# Patient Record
Sex: MALE | Race: WHITE | Employment: FULL TIME | URBAN - NONMETROPOLITAN AREA
[De-identification: names, ages, dates, MRNs, and addresses within clinical notes are randomized per-mention and may not be internally consistent; named-entity substitution may affect disease eponyms.]

---

## 2022-09-26 ENCOUNTER — HOSPITAL ENCOUNTER (OUTPATIENT)
Dept: ULTRASOUND IMAGING | Age: 52
Discharge: HOME | End: 2022-09-26
Payer: OTHER GOVERNMENT

## 2022-09-26 DIAGNOSIS — R94.5 ABNORMAL RESULTS OF LIVER FUNCTION STUDIES: ICD-10-CM

## 2022-09-26 PROCEDURE — 76705 ECHO EXAM OF ABDOMEN: CPT

## 2022-10-20 ENCOUNTER — HOSPITAL ENCOUNTER (OUTPATIENT)
Age: 52
Discharge: HOME | End: 2022-10-20
Payer: OTHER GOVERNMENT

## 2022-10-20 DIAGNOSIS — D49.7 PITUITARY TUMOR: ICD-10-CM

## 2022-10-20 DIAGNOSIS — R79.89 LOW TESTOSTERONE IN MALE: ICD-10-CM

## 2022-10-20 LAB
ALBUMIN SERPL-MCNC: 4.7 G/DL (ref 3.5–5.1)
ALP BLD-CCNC: 71 U/L (ref 38–126)
ALT SERPL-CCNC: 48 U/L (ref 11–66)
ANION GAP SERPL CALCULATED.3IONS-SCNC: 13 MEQ/L (ref 8–16)
AST SERPL-CCNC: 32 U/L (ref 5–40)
BILIRUB SERPL-MCNC: 1.7 MG/DL (ref 0.3–1.2)
BUN BLDV-MCNC: 17 MG/DL (ref 7–22)
CALCIUM SERPL-MCNC: 10 MG/DL (ref 8.5–10.5)
CHLORIDE BLD-SCNC: 106 MEQ/L (ref 98–111)
CO2: 23 MEQ/L (ref 23–33)
CORTISOL COLLECTION INFO: NORMAL
CORTISOL: 6.79 UG/DL
CREAT SERPL-MCNC: 1.2 MG/DL (ref 0.4–1.2)
FOLLICLE STIMULATING HORMONE: 18.6 MIU/ML (ref 1.5–12.4)
GFR SERPL CREATININE-BSD FRML MDRD: > 60 ML/MIN/1.73M2
GLUCOSE BLD-MCNC: 110 MG/DL (ref 70–108)
LUTEINIZING HORMONE: 22.3 MIU/ML (ref 1.7–8.6)
POTASSIUM SERPL-SCNC: 4.1 MEQ/L (ref 3.5–5.2)
PROLACTIN: 8.6 NG/ML
SODIUM BLD-SCNC: 142 MEQ/L (ref 135–145)
TOTAL PROTEIN: 7.3 G/DL (ref 6.1–8)
TSH SERPL DL<=0.05 MIU/L-ACNC: 1.09 UIU/ML (ref 0.4–4.2)

## 2022-10-20 PROCEDURE — 36415 COLL VENOUS BLD VENIPUNCTURE: CPT

## 2022-10-20 PROCEDURE — 83002 ASSAY OF GONADOTROPIN (LH): CPT

## 2022-10-20 PROCEDURE — 84270 ASSAY OF SEX HORMONE GLOBUL: CPT

## 2022-10-20 PROCEDURE — 80061 LIPID PANEL: CPT

## 2022-10-20 PROCEDURE — 84403 ASSAY OF TOTAL TESTOSTERONE: CPT

## 2022-10-20 PROCEDURE — 84146 ASSAY OF PROLACTIN: CPT

## 2022-10-20 PROCEDURE — 84439 ASSAY OF FREE THYROXINE: CPT

## 2022-10-20 PROCEDURE — 84443 ASSAY THYROID STIM HORMONE: CPT

## 2022-10-20 PROCEDURE — 84402 ASSAY OF FREE TESTOSTERONE: CPT

## 2022-10-20 PROCEDURE — 82533 TOTAL CORTISOL: CPT

## 2022-10-20 PROCEDURE — 80053 COMPREHEN METABOLIC PANEL: CPT

## 2022-10-20 PROCEDURE — 83001 ASSAY OF GONADOTROPIN (FSH): CPT

## 2022-10-20 PROCEDURE — 82397 CHEMILUMINESCENT ASSAY: CPT

## 2022-10-21 LAB
CHOLESTEROL, TOTAL: 171 MG/DL (ref 100–199)
HDLC SERPL-MCNC: 35 MG/DL
LDL CHOLESTEROL CALCULATED: 89 MG/DL
T4 FREE: 1.23 NG/DL (ref 0.93–1.76)
TRIGL SERPL-MCNC: 235 MG/DL (ref 0–199)

## 2022-10-22 LAB
IGF BINDING PROTEIN-3: 4010 NG/ML (ref 2528–5050)
TESTOSTERONE FREE: NORMAL

## 2023-06-29 ENCOUNTER — HOSPITAL ENCOUNTER (OUTPATIENT)
Dept: MRI IMAGING | Age: 53
Discharge: HOME | End: 2023-06-29
Payer: OTHER GOVERNMENT

## 2023-06-29 DIAGNOSIS — D35.2 PITUITARY MACROADENOMA (HCC): ICD-10-CM

## 2023-06-29 PROCEDURE — A9579 GAD-BASE MR CONTRAST NOS,1ML: HCPCS | Performed by: STUDENT IN AN ORGANIZED HEALTH CARE EDUCATION/TRAINING PROGRAM

## 2023-06-29 PROCEDURE — 70553 MRI BRAIN STEM W/O & W/DYE: CPT

## 2023-06-29 PROCEDURE — 6360000004 HC RX CONTRAST MEDICATION: Performed by: STUDENT IN AN ORGANIZED HEALTH CARE EDUCATION/TRAINING PROGRAM

## 2023-06-29 RX ADMIN — GADOTERIDOL 20 ML: 279.3 INJECTION, SOLUTION INTRAVENOUS at 15:47

## 2023-08-02 ENCOUNTER — OFFICE VISIT (OUTPATIENT)
Dept: CARDIOLOGY CLINIC | Age: 53
End: 2023-08-02
Payer: OTHER GOVERNMENT

## 2023-08-02 ENCOUNTER — TELEPHONE (OUTPATIENT)
Dept: CARDIOLOGY CLINIC | Age: 53
End: 2023-08-02

## 2023-08-02 VITALS
DIASTOLIC BLOOD PRESSURE: 93 MMHG | SYSTOLIC BLOOD PRESSURE: 143 MMHG | WEIGHT: 305 LBS | BODY MASS INDEX: 46.23 KG/M2 | HEIGHT: 68 IN | HEART RATE: 89 BPM

## 2023-08-02 DIAGNOSIS — I50.32 CHRONIC HEART FAILURE WITH PRESERVED EJECTION FRACTION (HFPEF) (HCC): ICD-10-CM

## 2023-08-02 DIAGNOSIS — R06.09 DOE (DYSPNEA ON EXERTION): ICD-10-CM

## 2023-08-02 DIAGNOSIS — R42 DIZZINESS: Primary | ICD-10-CM

## 2023-08-02 PROCEDURE — 93000 ELECTROCARDIOGRAM COMPLETE: CPT | Performed by: INTERNAL MEDICINE

## 2023-08-02 PROCEDURE — 99204 OFFICE O/P NEW MOD 45 MIN: CPT | Performed by: INTERNAL MEDICINE

## 2023-08-02 RX ORDER — FUROSEMIDE 20 MG/1
20 TABLET ORAL DAILY
Qty: 90 TABLET | Refills: 1 | Status: SHIPPED | OUTPATIENT
Start: 2023-08-02 | End: 2023-08-02 | Stop reason: SDUPTHER

## 2023-08-02 RX ORDER — FUROSEMIDE 20 MG/1
20 TABLET ORAL DAILY
Qty: 90 TABLET | Refills: 1 | Status: SHIPPED | OUTPATIENT
Start: 2023-08-02

## 2023-08-02 NOTE — PROGRESS NOTES
[address]  Dept: [phone]  Dept Fax: [phone]  Loc: [phone]    Visit Date: 8/2/2023    Mr. Carlyon Lombard is a 46 y.o. male  who presented for:  Chief Complaint   Patient presents with    New Patient   Establish care  POTS  Leg swelling  CP, ROBERTS  HPI:   HPI   Mabel Braden is a pleasant 46year old male patient who has PMH of PE, POTS, HTN, Dyslipidemia. He report h/o POTS that was diagnosed at [facility] many years ago. He has been treated with atenolol, symptoms have improved since then. Denies dizziness. He has [address] 4 years ago at OSH, \"ok\" per patient, no h/o stenting. Denies smoking history. He denies h/o MI, CHF. He was previously treated with a diuretic for leg swelling. Swelling imprvoed, but diuretics were stopped due to h/o gout. Now with recurrent swelling in both legs. He reports shortness of breath, dyspnea on exertion and Fatigue. He has occasional right sided chest pain. Better with GERD medicine. He takes warfarin for unprovoked PE. INR is watched by his [facility] coumdion clinic.      Current Outpatient Medications:     letrozole (FEMARA) 2.5 MG tablet, Take 1 tablet by mouth daily, Disp: 30 tablet, Rfl: 2    warfarin (COUMADIN) 5 MG tablet, Take 5 mg by mouth, Disp: , Rfl:     omeprazole (PRILOSEC) 20 MG delayed release capsule, Take 20 mg by mouth daily, Disp: , Rfl:     DULoxetine (CYMBALTA) 60 MG extended release capsule, Take 60 mg by mouth daily, Disp: , Rfl:     senna (SENOKOT) 8.6 MG tablet, Take 1 tablet by mouth daily, Disp: , Rfl:     atenolol (TENORMIN) 100 MG tablet, Take 100 mg by mouth daily, Disp: , Rfl:     cetirizine (ZYRTEC) 10 MG tablet, Take 10 mg by mouth daily, Disp: , Rfl:     allopurinol (ZYLOPRIM) 100 MG tablet, Take 100 mg by mouth daily, Disp: , Rfl:     pravastatin (PRAVACHOL) 40 MG tablet, Take 40 mg by mouth daily, Disp: , Rfl:     Past Medical

## 2023-08-22 ENCOUNTER — HOSPITAL ENCOUNTER (OUTPATIENT)
Dept: NON INVASIVE DIAGNOSTICS | Age: 53
Discharge: HOME | End: 2023-08-22
Attending: INTERNAL MEDICINE
Payer: OTHER GOVERNMENT

## 2023-08-22 DIAGNOSIS — I50.32 CHRONIC HEART FAILURE WITH PRESERVED EJECTION FRACTION (HFPEF) (HCC): ICD-10-CM

## 2023-08-22 DIAGNOSIS — R42 DIZZINESS: ICD-10-CM

## 2023-08-22 DIAGNOSIS — R06.09 DOE (DYSPNEA ON EXERTION): ICD-10-CM

## 2023-08-22 LAB
LV EF: 58 %
LVEF MODALITY: NORMAL

## 2023-08-22 PROCEDURE — 6360000002 HC RX W HCPCS

## 2023-08-22 PROCEDURE — 3430000000 HC RX DIAGNOSTIC RADIOPHARMACEUTICAL: Performed by: INTERNAL MEDICINE

## 2023-08-22 PROCEDURE — 93306 TTE W/DOPPLER COMPLETE: CPT

## 2023-08-22 PROCEDURE — 93017 CV STRESS TEST TRACING ONLY: CPT | Performed by: INTERNAL MEDICINE

## 2023-08-22 PROCEDURE — A9500 TC99M SESTAMIBI: HCPCS | Performed by: INTERNAL MEDICINE

## 2023-08-22 PROCEDURE — 78452 HT MUSCLE IMAGE SPECT MULT: CPT | Performed by: INTERNAL MEDICINE

## 2023-08-22 RX ORDER — TETRAKIS(2-METHOXYISOBUTYLISOCYANIDE)COPPER(I) TETRAFLUOROBORATE 1 MG/ML
33.8 INJECTION, POWDER, LYOPHILIZED, FOR SOLUTION INTRAVENOUS
Status: COMPLETED | OUTPATIENT
Start: 2023-08-22 | End: 2023-08-22

## 2023-08-22 RX ORDER — TETRAKIS(2-METHOXYISOBUTYLISOCYANIDE)COPPER(I) TETRAFLUOROBORATE 1 MG/ML
9.5 INJECTION, POWDER, LYOPHILIZED, FOR SOLUTION INTRAVENOUS
Status: COMPLETED | OUTPATIENT
Start: 2023-08-22 | End: 2023-08-22

## 2023-08-22 RX ADMIN — Medication 33.8 MILLICURIE: at 09:00

## 2023-08-22 RX ADMIN — Medication 9.5 MILLICURIE: at 08:10

## 2024-01-31 ENCOUNTER — HOSPITAL ENCOUNTER (OUTPATIENT)
Age: 54
Discharge: HOME | End: 2024-01-31
Payer: OTHER GOVERNMENT

## 2024-01-31 ENCOUNTER — OFFICE VISIT (OUTPATIENT)
Age: 54
End: 2024-01-31
Payer: OTHER GOVERNMENT

## 2024-01-31 VITALS
HEART RATE: 67 BPM | HEIGHT: 69 IN | SYSTOLIC BLOOD PRESSURE: 130 MMHG | WEIGHT: 314 LBS | DIASTOLIC BLOOD PRESSURE: 84 MMHG | BODY MASS INDEX: 46.51 KG/M2 | RESPIRATION RATE: 18 BRPM

## 2024-01-31 DIAGNOSIS — R79.89 LOW TESTOSTERONE IN MALE: ICD-10-CM

## 2024-01-31 DIAGNOSIS — R73.03 PRE-DIABETES: ICD-10-CM

## 2024-01-31 DIAGNOSIS — D49.7 PITUITARY TUMOR: Primary | ICD-10-CM

## 2024-01-31 DIAGNOSIS — E78.5 HYPERLIPIDEMIA, UNSPECIFIED HYPERLIPIDEMIA TYPE: ICD-10-CM

## 2024-01-31 DIAGNOSIS — I26.99 PULMONARY EMBOLISM, OTHER, UNSPECIFIED CHRONICITY, UNSPECIFIED WHETHER ACUTE COR PULMONALE PRESENT (HCC): ICD-10-CM

## 2024-01-31 LAB
CHOLEST SERPL-MCNC: 202 MG/DL (ref 100–199)
DEPRECATED MEAN GLUCOSE BLD GHB EST-ACNC: 120 MG/DL (ref 70–126)
HBA1C MFR BLD HPLC: 6 % (ref 4.4–6.4)
HDLC SERPL-MCNC: 34 MG/DL
INR PPP: 1.71 (ref 0.85–1.13)
LDLC SERPL CALC-MCNC: ABNORMAL MG/DL
PROLACTIN SERPL-MCNC: 7.8 NG/ML
T4 FREE SERPL-MCNC: 1.36 NG/DL (ref 0.93–1.76)
TRIGL SERPL-MCNC: 403 MG/DL (ref 0–199)
TSH SERPL DL<=0.005 MIU/L-ACNC: 0.69 UIU/ML (ref 0.4–4.2)

## 2024-01-31 PROCEDURE — 84305 ASSAY OF SOMATOMEDIN: CPT

## 2024-01-31 PROCEDURE — 84443 ASSAY THYROID STIM HORMONE: CPT

## 2024-01-31 PROCEDURE — 85610 PROTHROMBIN TIME: CPT

## 2024-01-31 PROCEDURE — 83036 HEMOGLOBIN GLYCOSYLATED A1C: CPT

## 2024-01-31 PROCEDURE — 83002 ASSAY OF GONADOTROPIN (LH): CPT

## 2024-01-31 PROCEDURE — 84439 ASSAY OF FREE THYROXINE: CPT

## 2024-01-31 PROCEDURE — 83001 ASSAY OF GONADOTROPIN (FSH): CPT

## 2024-01-31 PROCEDURE — 36415 COLL VENOUS BLD VENIPUNCTURE: CPT

## 2024-01-31 PROCEDURE — 84403 ASSAY OF TOTAL TESTOSTERONE: CPT

## 2024-01-31 PROCEDURE — 84146 ASSAY OF PROLACTIN: CPT

## 2024-01-31 PROCEDURE — 80061 LIPID PANEL: CPT

## 2024-01-31 PROCEDURE — 99214 OFFICE O/P EST MOD 30 MIN: CPT | Performed by: INTERNAL MEDICINE

## 2024-01-31 RX ORDER — ACETAMINOPHEN 500 MG
500 TABLET ORAL EVERY 6 HOURS PRN
COMMUNITY

## 2024-01-31 RX ORDER — PRAZOSIN HYDROCHLORIDE 5 MG/1
5 CAPSULE ORAL NIGHTLY
COMMUNITY

## 2024-01-31 RX ORDER — ALBUTEROL SULFATE 90 UG/1
2 AEROSOL, METERED RESPIRATORY (INHALATION) EVERY 6 HOURS PRN
COMMUNITY

## 2024-01-31 NOTE — PROGRESS NOTES
is normal.        Assessment/Plan:   Diagnosis Orders   1. Pituitary tumor  Previously with no evidence of hormonal disturbance.  No major symptoms at this point.  Patient was reminded of the risk of pituitary apoplexy.  We will obtain recent imaging studies for my review.  I will check prolactin and IGF-I.      2. Low testosterone in male  Previously treated with letrozole off label.  He has not noticed any clinical changes since he discontinued this medication.  I will check his testosterone and LH levels.                                      3. Pre-diabetes  Patient has gained some weight since the last visit.  Will check his A1c.      4. Hyperlipidemia, unspecified hyperlipidemia type  Treated with  Pravachol which is well-tolerated.  We will check his lipids      5. Pulmonary embolism, other, unspecified chronicity, unspecified whether acute cor pulmonale present (HCC)  On Coumadin.  Monitored by Levi JETT.  Patient requested INR check.  He will have report forwarded to the date and the          Orders Placed This Encounter   Procedures    Follicle Stimulating Hormone     Standing Status:   Future     Standing Expiration Date:   4/30/2024    Luteinizing Hormone     Standing Status:   Future     Standing Expiration Date:   4/30/2024    Testosterone male     Standing Status:   Future     Standing Expiration Date:   4/30/2024    Prolactin     Standing Status:   Future     Standing Expiration Date:   4/30/2024    Insulin-Like Growth Factor     Standing Status:   Future     Standing Expiration Date:   4/30/2024    TSH     Standing Status:   Future     Standing Expiration Date:   4/30/2024    T4, Free     Standing Status:   Future     Standing Expiration Date:   4/30/2024    Hemoglobin A1C     Standing Status:   Future     Standing Expiration Date:   4/30/2024    Lipid Panel     Standing Status:   Future     Standing Expiration Date:   4/30/2024    Protime-INR     Standing Status:   Future     Standing Expiration

## 2024-02-01 LAB
FOLLICLE STIMULATING HORMONE: 4.2 MIU/ML (ref 1.5–12.4)
LUTEINIZING HORMONE: 4.4 MIU/ML (ref 1.7–8.6)

## 2024-02-02 LAB
IGF-I SERPL-MCNC: 124 NG/ML (ref 62–214)
IGF-I Z-SCORE SERPL: 0
TESTOST SERPL-MCNC: 112 NG/DL (ref 300–890)

## 2024-05-06 ENCOUNTER — HOSPITAL ENCOUNTER (OUTPATIENT)
Dept: INFUSION THERAPY | Age: 54
Discharge: HOME | End: 2024-05-06
Payer: OTHER GOVERNMENT

## 2024-05-06 ENCOUNTER — OFFICE VISIT (OUTPATIENT)
Dept: ONCOLOGY | Age: 54
End: 2024-05-06
Payer: OTHER GOVERNMENT

## 2024-05-06 VITALS — RESPIRATION RATE: 16 BRPM | SYSTOLIC BLOOD PRESSURE: 143 MMHG | DIASTOLIC BLOOD PRESSURE: 80 MMHG

## 2024-05-06 VITALS
DIASTOLIC BLOOD PRESSURE: 80 MMHG | WEIGHT: 313 LBS | RESPIRATION RATE: 16 BRPM | SYSTOLIC BLOOD PRESSURE: 143 MMHG | BODY MASS INDEX: 46.22 KG/M2

## 2024-05-06 DIAGNOSIS — D68.62 LUPUS ANTICOAGULANT INHIBITOR SYNDROME (HCC): ICD-10-CM

## 2024-05-06 DIAGNOSIS — D68.62 LUPUS ANTICOAGULANT INHIBITOR SYNDROME (HCC): Primary | ICD-10-CM

## 2024-05-06 LAB
ALBUMIN SERPL BCG-MCNC: 4.4 G/DL (ref 3.5–5.1)
ALP SERPL-CCNC: 64 U/L (ref 38–126)
ALT SERPL W/O P-5'-P-CCNC: 41 U/L (ref 11–66)
ANION GAP SERPL CALC-SCNC: 12 MEQ/L (ref 8–16)
AST SERPL-CCNC: 27 U/L (ref 5–40)
BASOPHILS ABSOLUTE: 0.1 THOU/MM3 (ref 0–0.1)
BASOPHILS NFR BLD AUTO: 0.9 %
BILIRUB CONJ SERPL-MCNC: < 0.2 MG/DL (ref 0–0.3)
BILIRUB SERPL-MCNC: 1.5 MG/DL (ref 0.3–1.2)
BUN SERPL-MCNC: 17 MG/DL (ref 7–22)
CALCIUM SERPL-MCNC: 9.6 MG/DL (ref 8.5–10.5)
CHLORIDE SERPL-SCNC: 104 MEQ/L (ref 98–111)
CO2 SERPL-SCNC: 26 MEQ/L (ref 23–33)
CREAT SERPL-MCNC: 1.2 MG/DL (ref 0.4–1.2)
DEPRECATED RDW RBC AUTO: 42.7 FL (ref 35–45)
EOSINOPHIL NFR BLD AUTO: 1.6 %
EOSINOPHILS ABSOLUTE: 0.1 THOU/MM3 (ref 0–0.4)
ERYTHROCYTE [DISTWIDTH] IN BLOOD BY AUTOMATED COUNT: 13.6 % (ref 11.5–14.5)
GFR SERPL CREATININE-BSD FRML MDRD: 71 ML/MIN/1.73M2
GLUCOSE SERPL-MCNC: 95 MG/DL (ref 70–108)
HCT VFR BLD AUTO: 47.2 % (ref 42–52)
HGB BLD-MCNC: 16.1 GM/DL (ref 14–18)
IMM GRANULOCYTES # BLD AUTO: 0.02 THOU/MM3 (ref 0–0.07)
IMM GRANULOCYTES NFR BLD AUTO: 0.3 %
LYMPHOCYTES ABSOLUTE: 1.8 THOU/MM3 (ref 1–4.8)
LYMPHOCYTES NFR BLD AUTO: 31 %
MCH RBC QN AUTO: 29.9 PG (ref 26–33)
MCHC RBC AUTO-ENTMCNC: 34.1 GM/DL (ref 32.2–35.5)
MCV RBC AUTO: 87.6 FL (ref 80–94)
MONOCYTES ABSOLUTE: 0.5 THOU/MM3 (ref 0.4–1.3)
MONOCYTES NFR BLD AUTO: 8.1 %
NEUTROPHILS ABSOLUTE: 3.4 THOU/MM3 (ref 1.8–7.7)
NEUTROPHILS NFR BLD AUTO: 58.1 %
NRBC BLD AUTO-RTO: 0 /100 WBC
PLATELET # BLD AUTO: 250 THOU/MM3 (ref 130–400)
PMV BLD AUTO: 10.8 FL (ref 9.4–12.4)
POTASSIUM SERPL-SCNC: 4.5 MEQ/L (ref 3.5–5.2)
PROT SERPL-MCNC: 7 G/DL (ref 6.1–8)
RBC # BLD AUTO: 5.39 MILL/MM3 (ref 4.7–6.1)
SODIUM SERPL-SCNC: 142 MEQ/L (ref 135–145)
WBC # BLD AUTO: 5.8 THOU/MM3 (ref 4.8–10.8)

## 2024-05-06 PROCEDURE — 99204 OFFICE O/P NEW MOD 45 MIN: CPT | Performed by: NURSE PRACTITIONER

## 2024-05-06 PROCEDURE — 36415 COLL VENOUS BLD VENIPUNCTURE: CPT

## 2024-05-06 PROCEDURE — 82248 BILIRUBIN DIRECT: CPT

## 2024-05-06 PROCEDURE — 85300 ANTITHROMBIN III ACTIVITY: CPT

## 2024-05-06 PROCEDURE — 85301 ANTITHROMBIN III ANTIGEN: CPT

## 2024-05-06 PROCEDURE — 85025 COMPLETE CBC W/AUTO DIFF WBC: CPT

## 2024-05-06 PROCEDURE — 80053 COMPREHEN METABOLIC PANEL: CPT

## 2024-05-06 PROCEDURE — 99211 OFF/OP EST MAY X REQ PHY/QHP: CPT

## 2024-05-06 PROCEDURE — 81241 F5 GENE: CPT

## 2024-05-06 PROCEDURE — 86147 CARDIOLIPIN ANTIBODY EA IG: CPT

## 2024-05-06 PROCEDURE — 81240 F2 GENE: CPT

## 2024-05-06 PROCEDURE — 86146 BETA-2 GLYCOPROTEIN ANTIBODY: CPT

## 2024-05-06 RX ORDER — AMOXICILLIN 250 MG
1 CAPSULE ORAL DAILY
COMMUNITY
Start: 2023-12-04

## 2024-05-06 ASSESSMENT — ENCOUNTER SYMPTOMS: SHORTNESS OF BREATH: 1

## 2024-05-06 NOTE — PROGRESS NOTES
Penicillins           Review of Systems:   Review of Systems   Eyes:  Positive for visual disturbance (Blurry vision).   Respiratory:  Positive for shortness of breath (Chronic, obstructive sleep apnea).    Cardiovascular:  Positive for leg swelling (Bilateral, chronic).   Genitourinary:  Positive for hematuria (3 months ago).   Neurological:  Positive for headaches.   Hematological:  Bruises/bleeds easily.   All other systems reviewed and are negative.     Pertinent review of systems noted in HPI, all other ROS negative.   Objective:   Physical Exam  Vitals and nursing note reviewed.   Constitutional:       Appearance: He is obese.   HENT:      Head: Normocephalic.      Nose: Nose normal.      Mouth/Throat:      Pharynx: Oropharynx is clear.   Eyes:      Extraocular Movements: Extraocular movements intact.      Conjunctiva/sclera: Conjunctivae normal.      Pupils: Pupils are equal, round, and reactive to light.   Cardiovascular:      Rate and Rhythm: Normal rate and regular rhythm.      Pulses: Normal pulses.      Heart sounds: Normal heart sounds.   Pulmonary:      Effort: Pulmonary effort is normal.      Breath sounds: Normal breath sounds.   Abdominal:      General: Bowel sounds are normal.      Palpations: Abdomen is soft.   Musculoskeletal:      Right lower le+ Edema present.      Left lower le+ Edema present.   Skin:     General: Skin is warm.   Neurological:      General: No focal deficit present.      Mental Status: He is alert and oriented to person, place, and time. Mental status is at baseline.   Psychiatric:         Mood and Affect: Mood normal.         Behavior: Behavior normal.         Thought Content: Thought content normal.         Judgment: Judgment normal.        BP (!) 143/80   Resp 16   Wt (!) 142 kg (313 lb)   BMI 46.22 kg/m²              Imaging Studies and Labs:   CBC: No results found for: \"WBC\", \"HGB\", \"HCT\", \"MCV\", \"PLT\"  BMP:   Lab Results   Component Value Date/Time    NA

## 2024-05-09 LAB
B2 GLYCOPROT1 IGG SERPL IA-ACNC: < 10 SGU
B2 GLYCOPROT1 IGM SERPL IA-ACNC: < 10 SMU
CARDIOLIPIN IGG SER IA-ACNC: 10 GPL
CARDIOLIPIN IGM SER IA-ACNC: < 10 MPL
MISC. #1 REFERENCE GROUP TEST: NORMAL

## 2024-05-11 LAB
F2 C.20210G>A GENO BLD/T: NEGATIVE
SPECIMEN SOURCE: NORMAL

## 2024-05-12 LAB — F5 P.R506Q BLD/T QL: NEGATIVE

## 2024-05-28 ENCOUNTER — PATIENT MESSAGE (OUTPATIENT)
Dept: ONCOLOGY | Age: 54
End: 2024-05-28

## 2024-05-29 NOTE — TELEPHONE ENCOUNTER
From: Rufus Franco  To: Katya Hogue  Sent: 5/28/2024 5:38 PM EDT  Subject: Result     I [facility] came back 1.8

## 2024-07-19 ENCOUNTER — TELEPHONE (OUTPATIENT)
Dept: CARDIOLOGY CLINIC | Age: 54
End: 2024-07-19

## 2024-07-24 ENCOUNTER — OFFICE VISIT (OUTPATIENT)
Dept: NEUROLOGY | Age: 54
End: 2024-07-24
Payer: OTHER GOVERNMENT

## 2024-07-24 VITALS
OXYGEN SATURATION: 97 % | DIASTOLIC BLOOD PRESSURE: 80 MMHG | BODY MASS INDEX: 46.21 KG/M2 | HEIGHT: 69 IN | SYSTOLIC BLOOD PRESSURE: 124 MMHG | WEIGHT: 312 LBS | HEART RATE: 65 BPM

## 2024-07-24 DIAGNOSIS — G44.89 OTHER HEADACHE SYNDROME: Primary | ICD-10-CM

## 2024-07-24 DIAGNOSIS — R93.0 ABNORMAL CT OF THE HEAD: ICD-10-CM

## 2024-07-24 PROCEDURE — 99205 OFFICE O/P NEW HI 60 MIN: CPT | Performed by: PSYCHIATRY & NEUROLOGY

## 2024-07-24 NOTE — PATIENT INSTRUCTIONS
MRI brain W/WO contrast  Referral to ophthalmology for dilated eye exam  Continue wearing your BiPAP nightly  Keep headache diary  Call with any new symptoms or concerns.   Follow up in 6 weeks.

## 2024-08-06 ENCOUNTER — HOSPITAL ENCOUNTER (OUTPATIENT)
Dept: MRI IMAGING | Age: 54
Discharge: HOME | End: 2024-08-06
Payer: OTHER GOVERNMENT

## 2024-08-06 DIAGNOSIS — G44.89 OTHER HEADACHE SYNDROME: ICD-10-CM

## 2024-08-06 DIAGNOSIS — R93.0 ABNORMAL CT OF THE HEAD: ICD-10-CM

## 2024-08-06 LAB — POC CREATININE WHOLE BLOOD: 1.3 MG/DL (ref 0.5–1.2)

## 2024-08-06 PROCEDURE — 6360000004 HC RX CONTRAST MEDICATION: Performed by: NURSE PRACTITIONER

## 2024-08-06 PROCEDURE — A9579 GAD-BASE MR CONTRAST NOS,1ML: HCPCS | Performed by: NURSE PRACTITIONER

## 2024-08-06 PROCEDURE — 70553 MRI BRAIN STEM W/O & W/DYE: CPT

## 2024-08-06 PROCEDURE — 82565 ASSAY OF CREATININE: CPT

## 2024-08-06 RX ADMIN — GADOTERIDOL 20 ML: 279.3 INJECTION, SOLUTION INTRAVENOUS at 06:59

## 2024-08-14 ENCOUNTER — OFFICE VISIT (OUTPATIENT)
Dept: CARDIOLOGY CLINIC | Age: 54
End: 2024-08-14
Payer: OTHER GOVERNMENT

## 2024-08-14 VITALS
HEART RATE: 99 BPM | SYSTOLIC BLOOD PRESSURE: 154 MMHG | DIASTOLIC BLOOD PRESSURE: 84 MMHG | BODY MASS INDEX: 46.65 KG/M2 | HEIGHT: 69 IN | WEIGHT: 315 LBS

## 2024-08-14 DIAGNOSIS — E88.810 METABOLIC SYNDROME: ICD-10-CM

## 2024-08-14 DIAGNOSIS — E78.5 DYSLIPIDEMIA: ICD-10-CM

## 2024-08-14 DIAGNOSIS — G90.A POTS (POSTURAL ORTHOSTATIC TACHYCARDIA SYNDROME): Primary | ICD-10-CM

## 2024-08-14 PROCEDURE — 93000 ELECTROCARDIOGRAM COMPLETE: CPT | Performed by: PHYSICIAN ASSISTANT

## 2024-08-14 PROCEDURE — 99214 OFFICE O/P EST MOD 30 MIN: CPT | Performed by: PHYSICIAN ASSISTANT

## 2024-08-14 RX ORDER — FUROSEMIDE 20 MG/1
TABLET ORAL
Qty: 45 TABLET | Refills: 3 | Status: SHIPPED | OUTPATIENT
Start: 2024-08-14

## 2024-08-14 RX ORDER — ATENOLOL 100 MG/1
100 TABLET ORAL DAILY
Qty: 90 TABLET | Refills: 3 | Status: SHIPPED | OUTPATIENT
Start: 2024-08-14

## 2024-08-14 NOTE — PROGRESS NOTES
!  +--------+-----+------+-------+-----------+--+----------+------+-----------+  !Recovery!03:00!      !86     !152/84     !  !          !      !           !  +--------+-----+------+-------+-----------+--+----------+------+-----------+  !Recovery!04:00!      !78     !145/81     !  !          !      !           !  +--------+-----+------+-------+-----------+--+----------+------+-----------+  !Recovery!05:00!      !80     !146/85     !  !          !      !           !  +--------+-----+------+-------+-----------+--+----------+------+-----------+     Peak HR:90 bpm                                      HR/BP product:42015   Peak BP:152/84 mmHg   Predicted HR: 166 bpm   % of predicted HR: 54   Test duration: 3 min   Reason for termination:Protocol completed      ECG Findings   Nonspecific ST-T wave changes.      Complications   Procedure complication was none.      Imaging Protocols      Rest                                Stress      Isotope:Tc-99m Sestamibi            Isotope: Tc-99m Sestamibi   Isotope dose:9.5 mCi                Isotope dose:33.8 mCi   Date:08/22/2023 08:10               Date:08/22/2023 09:00      Technique:           SPECT          Technique:           Gated                                                            SPECT     Imaging Results:Calculated gated LVEF 54 %.  The T.I.D. ratio was 1.1 .  There was a small sized, mild in intensity, fixed myocardial perfusion  defect of the apical wall.  There is mild attenuation artifact noted in the inferior wall seems to be  related to bowel artifact.  The nuclear images is not suggestive for myocardial ischemia.     Conclusions      Summary   The nuclear images is not suggestive for myocardial ischemia.      Recommendation   Clinical correlation is recommended.      Signatures      ----------------------------------------------------------------   Electronically signed by Diamond Donovan MD (Interpreting   Cardiologist) on 08/22/2023 at 13:47

## 2024-09-16 ENCOUNTER — OFFICE VISIT (OUTPATIENT)
Dept: NEUROLOGY | Age: 54
End: 2024-09-16
Payer: OTHER GOVERNMENT

## 2024-09-16 VITALS
BODY MASS INDEX: 46.65 KG/M2 | HEART RATE: 66 BPM | SYSTOLIC BLOOD PRESSURE: 130 MMHG | WEIGHT: 315 LBS | OXYGEN SATURATION: 99 % | HEIGHT: 69 IN | DIASTOLIC BLOOD PRESSURE: 80 MMHG

## 2024-09-16 DIAGNOSIS — G44.89 OTHER HEADACHE SYNDROME: Primary | ICD-10-CM

## 2024-09-16 PROCEDURE — 99213 OFFICE O/P EST LOW 20 MIN: CPT | Performed by: NURSE PRACTITIONER

## 2024-12-16 ENCOUNTER — HOSPITAL ENCOUNTER (OUTPATIENT)
Age: 54
Discharge: HOME | End: 2024-12-16
Payer: OTHER GOVERNMENT

## 2024-12-16 ENCOUNTER — HOSPITAL ENCOUNTER (OUTPATIENT)
Dept: GENERAL RADIOLOGY | Age: 54
Discharge: HOME | End: 2024-12-16
Payer: OTHER GOVERNMENT

## 2024-12-16 DIAGNOSIS — M54.2 CERVICALGIA: ICD-10-CM

## 2024-12-16 PROCEDURE — 72040 X-RAY EXAM NECK SPINE 2-3 VW: CPT

## 2024-12-16 PROCEDURE — 72072 X-RAY EXAM THORAC SPINE 3VWS: CPT

## 2025-01-14 NOTE — PROGRESS NOTES
of the mucociliary transport mechanism to prevent reinfection from retained secretions.  After 4 weeks of maximum medical therapy, will obtain CT sinus with IGS protocol and return to discuss results and further plan of care.  All questions were answered.        Return in about 6 weeks (around 2/26/2025) for f/u audio and CT scan in about 6-8 weeks.           **This report has been created using voice recognition software. It may contain minor errors which are inherent in voice recognition technology.**    Electronically signed by Chela Sandhu PA-C on 1/15/2025 at 1:34 PM

## 2025-01-15 ENCOUNTER — OFFICE VISIT (OUTPATIENT)
Dept: ENT CLINIC | Age: 55
End: 2025-01-15
Payer: OTHER GOVERNMENT

## 2025-01-15 VITALS
WEIGHT: 315 LBS | BODY MASS INDEX: 46.65 KG/M2 | DIASTOLIC BLOOD PRESSURE: 80 MMHG | OXYGEN SATURATION: 98 % | HEART RATE: 64 BPM | SYSTOLIC BLOOD PRESSURE: 158 MMHG | HEIGHT: 69 IN | TEMPERATURE: 97.4 F

## 2025-01-15 DIAGNOSIS — J34.89 NASAL DRAINAGE: ICD-10-CM

## 2025-01-15 DIAGNOSIS — R09.81 NASAL CONGESTION: ICD-10-CM

## 2025-01-15 DIAGNOSIS — G47.33 OSA ON CPAP: ICD-10-CM

## 2025-01-15 DIAGNOSIS — H93.13 TINNITUS OF BOTH EARS: ICD-10-CM

## 2025-01-15 DIAGNOSIS — J32.8 OTHER CHRONIC SINUSITIS: Primary | ICD-10-CM

## 2025-01-15 DIAGNOSIS — H91.8X3 OTHER SPECIFIED HEARING LOSS OF BOTH EARS: ICD-10-CM

## 2025-01-15 DIAGNOSIS — R51.9 SINUS HEADACHE: ICD-10-CM

## 2025-01-15 PROCEDURE — 99204 OFFICE O/P NEW MOD 45 MIN: CPT

## 2025-01-15 RX ORDER — AMOXICILLIN 250 MG
CAPSULE ORAL
COMMUNITY
Start: 2024-10-08

## 2025-01-15 RX ORDER — DOXYCYCLINE HYCLATE 100 MG
100 TABLET ORAL 2 TIMES DAILY
Qty: 42 TABLET | Refills: 0 | Status: SHIPPED | OUTPATIENT
Start: 2025-01-15 | End: 2025-02-05

## 2025-01-15 RX ORDER — FLUTICASONE PROPIONATE 50 MCG
1 SPRAY, SUSPENSION (ML) NASAL DAILY
Qty: 16 G | Refills: 1 | Status: SHIPPED | OUTPATIENT
Start: 2025-01-15

## 2025-01-15 RX ORDER — LORATADINE 10 MG/1
10 TABLET ORAL DAILY
Qty: 90 TABLET | Refills: 0 | Status: SHIPPED | OUTPATIENT
Start: 2025-01-15

## 2025-01-15 NOTE — PATIENT INSTRUCTIONS
Recommend sinus work-up with 4 week course of NeilMed saline irrigations BID, nasal steroid spray and broad-spectrum antibiotic.  The rationale for prolonged antibiotic course was discussed including the importance of recovery of the mucociliary transport mechanism to prevent reinfection from retained secretions.  After 4 weeks of maximum medical therapy, will obtain CT sinus with IGS protocol and return to discuss results and further plan of care.  All questions were answered.

## 2025-01-21 ENCOUNTER — HOSPITAL ENCOUNTER (OUTPATIENT)
Dept: AUDIOLOGY | Age: 55
Discharge: HOME | End: 2025-01-21
Payer: OTHER GOVERNMENT

## 2025-01-21 PROCEDURE — 92567 TYMPANOMETRY: CPT | Performed by: AUDIOLOGIST

## 2025-01-21 PROCEDURE — 92557 COMPREHENSIVE HEARING TEST: CPT | Performed by: AUDIOLOGIST

## 2025-01-21 NOTE — PROGRESS NOTES
AUDIOLOGICAL EVALUATION      REASON FOR TESTING:  Audiometric evaluation per the request of Chela Sandhu, due to the diagnosis of tinnitus of both ears and other specified hearing loss of both ears. The patient states that he has noticed a bilateral hearing loss since 2004 and constant buzzing bilateral tinnitus starting in 2010 if not before. 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 He denies otalgia, otorrhea, ear fullness/ pressure, dizziness and vertigo. Other significant hearing history includes  Lupus anticoagulant inhibitor syndrome, POTS, and sleep apnea.    OTOSCOPY: clear canal with normal appearing tympanic membrane- bilaterally.      AUDIOGRAM        Reliability: Good    COMMENTS: Pure tone audiometry revealed a mild to moderate sensorineural hearing loss for the right ear and a mild to moderate sensorineural hearing loss at 250-6000 Hz rising to normal hearing for the left ear. Speech reception thresholds are in good agreement with pure tone results in both ears. Word recognition ability is excellent at 100% for the right ear and excelent at 100% for the left ear. Tympanometry revealed normal ear canal volume, normal peak pressure and normal middle ear compliance for both ears.      RECOMMENDATION(S):   1- Follow up with ENT provider regarding these results as planned.  2- Repeat testing as medically indicated or annually to monitor for progression, sooner with any significant changes or new concerns.  3- Binaural hearing aids are recommended. The patient is interested in [facility].

## 2025-02-26 ENCOUNTER — TELEPHONE (OUTPATIENT)
Dept: ENT CLINIC | Age: 55
End: 2025-02-26

## 2025-02-26 ENCOUNTER — HOSPITAL ENCOUNTER (OUTPATIENT)
Dept: CT IMAGING | Age: 55
Discharge: HOME | End: 2025-02-26
Payer: OTHER GOVERNMENT

## 2025-02-26 DIAGNOSIS — G47.33 OSA ON CPAP: ICD-10-CM

## 2025-02-26 DIAGNOSIS — J34.89 NASAL DRAINAGE: ICD-10-CM

## 2025-02-26 DIAGNOSIS — R09.81 NASAL CONGESTION: ICD-10-CM

## 2025-02-26 DIAGNOSIS — R51.9 SINUS HEADACHE: ICD-10-CM

## 2025-02-26 DIAGNOSIS — J32.8 OTHER CHRONIC SINUSITIS: ICD-10-CM

## 2025-02-26 PROCEDURE — 70486 CT MAXILLOFACIAL W/O DYE: CPT

## 2025-02-26 NOTE — TELEPHONE ENCOUNTER
CT sinus workup with CT scan complete, please call pt to schedule follow up with Dr. Pacheco to discuss surgical options for his nasal congestion at next available. If there is no availability to schedule with LAURA, preferably on a day Dr. Pacheco is in office.   (4) rarely moist

## 2025-02-27 NOTE — TELEPHONE ENCOUNTER
Attempted to call patient. Got voicemail, left message to call the office.     He left a voicemail on the clinical line last night returning my call.

## 2025-04-01 ENCOUNTER — OFFICE VISIT (OUTPATIENT)
Dept: ENT CLINIC | Age: 55
End: 2025-04-01
Payer: OTHER GOVERNMENT

## 2025-04-01 VITALS
BODY MASS INDEX: 46.65 KG/M2 | WEIGHT: 315 LBS | TEMPERATURE: 97.5 F | HEART RATE: 72 BPM | DIASTOLIC BLOOD PRESSURE: 100 MMHG | OXYGEN SATURATION: 98 % | HEIGHT: 69 IN | SYSTOLIC BLOOD PRESSURE: 158 MMHG

## 2025-04-01 DIAGNOSIS — J32.2 CHRONIC ETHMOIDAL SINUSITIS: ICD-10-CM

## 2025-04-01 DIAGNOSIS — J34.2 DEVIATED NASAL SEPTUM: ICD-10-CM

## 2025-04-01 DIAGNOSIS — J32.0 CHRONIC MAXILLARY SINUSITIS: ICD-10-CM

## 2025-04-01 DIAGNOSIS — J34.3 HYPERTROPHY OF BOTH INFERIOR NASAL TURBINATES: ICD-10-CM

## 2025-04-01 DIAGNOSIS — Z01.818 PREOP TESTING: Primary | ICD-10-CM

## 2025-04-01 DIAGNOSIS — J34.89 NASAL OBSTRUCTION: Primary | ICD-10-CM

## 2025-04-01 PROCEDURE — 99213 OFFICE O/P EST LOW 20 MIN: CPT | Performed by: OTOLARYNGOLOGY

## 2025-04-01 PROCEDURE — 31231 NASAL ENDOSCOPY DX: CPT | Performed by: OTOLARYNGOLOGY

## 2025-04-01 NOTE — PROGRESS NOTES
[facility] [place] [facility]  [facility], [facility]SE [facility]  [address]  [address]  [facility] [address]  Dept: [phone]  Dept Fax: [phone]  Loc: [phone]    Rufus Franco is a 55 y.o. male who was referred byNo ref. provider found for:  Chief Complaint   Patient presents with    Follow-up     CT follow up per Chela to review [facility] options - [facility]   .    HPI:     Rufus Franco is a 55 y.o. male who presents today for follow up. CT scan sinus 2/26/25: Scattered mild mucosal thickening in the ethmoid air cells. Mild mucosal thickening in the left maxillary sinus. The nasal septum deviates to the right. Rufus Franco is a 54 y.o. male presenting with headaches, recurrent sinus infections, and incidental maxillary sinus cyst or polyp.  Patient has had headaches for about a year and gets them 2-4 days a week.  He states that his headaches start on the back of his head and moved to the front of his head.  He also endorses chronic nasal drainage bilaterally but he has more out of R side and his drainage is mostly watery.he feels like he needs to blow nose but is too congested nothing comes out.  He endorses chronic nasal congestion, will also get bloody scabs come out of his nose, coughing mostly in the morning, tinnitus, and hearing loss.  He denies PND, sore throat, ear pain, ear drainage, and recurrent ear infections.  He states that he has been on antibiotics in the past which seemed to help short-term and that he tries the Gerda pot but is often unable to get the fluid to go through. Denies allergies, has never seen an allergist. ELIZABETH on CPAP, uses machine and has the half mask which seems to work well for him.  Of note he endorses that in 2018 he had surgical removal of 17 polyps out of [facility]. Recommend sinus work-up with 4 week course of NeilMed saline irrigations BID, nasal steroid spray and broad-spectrum antibiotic.  Pure tone

## 2025-04-02 ENCOUNTER — TELEPHONE (OUTPATIENT)
Dept: CARDIOLOGY CLINIC | Age: 55
End: 2025-04-02

## 2025-04-02 ENCOUNTER — PREP FOR PROCEDURE (OUTPATIENT)
Dept: ENT CLINIC | Age: 55
End: 2025-04-02

## 2025-04-02 DIAGNOSIS — J34.89 NASAL OBSTRUCTION: ICD-10-CM

## 2025-04-02 DIAGNOSIS — J34.2 NASAL SEPTAL DEVIATION: ICD-10-CM

## 2025-04-02 DIAGNOSIS — J32.0 CHRONIC MAXILLARY SINUSITIS: ICD-10-CM

## 2025-04-02 DIAGNOSIS — J32.2 CHRONIC ETHMOIDAL SINUSITIS: ICD-10-CM

## 2025-04-02 DIAGNOSIS — J34.3 HYPERTROPHY OF INFERIOR NASAL TURBINATE: ICD-10-CM

## 2025-04-02 NOTE — TELEPHONE ENCOUNTER
Call patient and inquire about any new cardiac events/symptoms since last visit  If none reported, low risk  Warfarin to be addressed by Hematology

## 2025-04-02 NOTE — TELEPHONE ENCOUNTER
Patient is being scheduled for a procedure with Dr. Pacheco and we are requesting clearance from Dr. Fields.    DOS: 04/28/2025  Procedure: Septoplasty, bilateral inferior turbinate reduction, bilateral anterior ethmoidectomy, bilateral maxillary antrostomy without removal of tissue   Meds to hold:  warfarin tbd from hematology/oncology at [facility].    Please advise. Thank you!

## 2025-04-02 NOTE — TELEPHONE ENCOUNTER
Last saw Dr. Fields 8/2/23, Luís Chapman 8/14/24.  Next appt with Dr. Fields 8/5/25.  Please advise.

## 2025-04-11 ENCOUNTER — LAB (OUTPATIENT)
Dept: LAB | Age: 55
End: 2025-04-11

## 2025-04-11 DIAGNOSIS — Z01.818 PREOP TESTING: ICD-10-CM

## 2025-04-11 LAB
ALBUMIN SERPL BCG-MCNC: 4 G/DL (ref 3.4–4.9)
ALP SERPL-CCNC: 58 U/L (ref 40–129)
ALT SERPL W/O P-5'-P-CCNC: 46 U/L (ref 10–50)
ANION GAP SERPL CALC-SCNC: 10 MEQ/L (ref 8–16)
AST SERPL-CCNC: 34 U/L (ref 10–50)
BASOPHILS ABSOLUTE: 0.1 THOU/MM3 (ref 0–0.1)
BASOPHILS NFR BLD AUTO: 1.1 %
BILIRUB SERPL-MCNC: 1.4 MG/DL (ref 0.3–1.2)
BUN SERPL-MCNC: 15 MG/DL (ref 8–23)
CALCIUM SERPL-MCNC: 9.1 MG/DL (ref 8.6–10)
CHLORIDE SERPL-SCNC: 105 MEQ/L (ref 98–111)
CO2 SERPL-SCNC: 25 MEQ/L (ref 22–29)
CREAT SERPL-MCNC: 1.3 MG/DL (ref 0.7–1.2)
DEPRECATED RDW RBC AUTO: 43 FL (ref 35–45)
EOSINOPHIL NFR BLD AUTO: 2.2 %
EOSINOPHILS ABSOLUTE: 0.2 THOU/MM3 (ref 0–0.4)
ERYTHROCYTE [DISTWIDTH] IN BLOOD BY AUTOMATED COUNT: 13.9 % (ref 11.5–14.5)
GFR SERPL CREATININE-BSD FRML MDRD: 64 ML/MIN/1.73M2
GLUCOSE SERPL-MCNC: 87 MG/DL (ref 74–109)
HCT VFR BLD AUTO: 45.3 % (ref 42–52)
HGB BLD-MCNC: 15.1 GM/DL (ref 14–18)
IMM GRANULOCYTES # BLD AUTO: 0.02 THOU/MM3 (ref 0–0.07)
IMM GRANULOCYTES NFR BLD AUTO: 0.3 %
LYMPHOCYTES ABSOLUTE: 1.6 THOU/MM3 (ref 1–4.8)
LYMPHOCYTES NFR BLD AUTO: 21.1 %
MCH RBC QN AUTO: 28.4 PG (ref 26–33)
MCHC RBC AUTO-ENTMCNC: 33.3 GM/DL (ref 32.2–35.5)
MCV RBC AUTO: 85.3 FL (ref 80–94)
MONOCYTES ABSOLUTE: 1 THOU/MM3 (ref 0.4–1.3)
MONOCYTES NFR BLD AUTO: 12.9 %
NEUTROPHILS ABSOLUTE: 4.7 THOU/MM3 (ref 1.8–7.7)
NEUTROPHILS NFR BLD AUTO: 62.4 %
NRBC BLD AUTO-RTO: 0 /100 WBC
PLATELET # BLD AUTO: 271 THOU/MM3 (ref 130–400)
PMV BLD AUTO: 10.9 FL (ref 9.4–12.4)
POTASSIUM SERPL-SCNC: 4.4 MEQ/L (ref 3.5–5.2)
PROT SERPL-MCNC: 7.2 G/DL (ref 6.4–8.3)
RBC # BLD AUTO: 5.31 MILL/MM3 (ref 4.7–6.1)
SODIUM SERPL-SCNC: 140 MEQ/L (ref 135–145)
WBC # BLD AUTO: 7.6 THOU/MM3 (ref 4.8–10.8)

## 2025-04-21 ENCOUNTER — PREP FOR PROCEDURE (OUTPATIENT)
Dept: ENT CLINIC | Age: 55
End: 2025-04-21

## 2025-04-21 ENCOUNTER — TELEPHONE (OUTPATIENT)
Dept: ENT CLINIC | Age: 55
End: 2025-04-21

## 2025-04-21 RX ORDER — SODIUM CHLORIDE 0.9 % (FLUSH) 0.9 %
5-40 SYRINGE (ML) INJECTION PRN
Status: CANCELLED | OUTPATIENT
Start: 2025-04-21

## 2025-04-21 RX ORDER — IPRATROPIUM BROMIDE AND ALBUTEROL SULFATE 2.5; .5 MG/3ML; MG/3ML
1 SOLUTION RESPIRATORY (INHALATION) EVERY 4 HOURS PRN
Status: CANCELLED | OUTPATIENT
Start: 2025-04-28

## 2025-04-21 RX ORDER — SODIUM CHLORIDE 0.9 % (FLUSH) 0.9 %
5-40 SYRINGE (ML) INJECTION EVERY 12 HOURS SCHEDULED
Status: CANCELLED | OUTPATIENT
Start: 2025-04-21

## 2025-04-21 RX ORDER — SODIUM CHLORIDE 9 MG/ML
INJECTION, SOLUTION INTRAVENOUS PRN
Status: CANCELLED | OUTPATIENT
Start: 2025-04-21

## 2025-04-21 NOTE — TELEPHONE ENCOUNTER
Patient called and his PCP had ordered INR as he is on warfarin.  It was low so they are wanting his labs drawn again with hope to get up above 2.  He would like to push surgery back a bit to try to get his numbers where they need to be. R/s for 05/07

## 2025-04-30 ENCOUNTER — PREP FOR PROCEDURE (OUTPATIENT)
Dept: ENT CLINIC | Age: 55
End: 2025-04-30

## 2025-04-30 RX ORDER — SODIUM CHLORIDE 0.9 % (FLUSH) 0.9 %
5-40 SYRINGE (ML) INJECTION EVERY 12 HOURS SCHEDULED
Status: CANCELLED | OUTPATIENT
Start: 2025-04-30

## 2025-04-30 RX ORDER — SODIUM CHLORIDE 9 MG/ML
INJECTION, SOLUTION INTRAVENOUS PRN
Status: CANCELLED | OUTPATIENT
Start: 2025-04-30

## 2025-04-30 RX ORDER — SODIUM CHLORIDE 0.9 % (FLUSH) 0.9 %
5-40 SYRINGE (ML) INJECTION PRN
Status: CANCELLED | OUTPATIENT
Start: 2025-04-30

## 2025-04-30 RX ORDER — IPRATROPIUM BROMIDE AND ALBUTEROL SULFATE 2.5; .5 MG/3ML; MG/3ML
1 SOLUTION RESPIRATORY (INHALATION) EVERY 4 HOURS PRN
Status: CANCELLED | OUTPATIENT
Start: 2025-05-07

## 2025-05-06 ENCOUNTER — TELEPHONE (OUTPATIENT)
Dept: ENT CLINIC | Age: 55
End: 2025-05-06

## 2025-05-06 NOTE — TELEPHONE ENCOUNTER
I called [facility] to see if [facility] is ready. I received a call back from Bety at Dr. Carver office stating patient is not cleared for surgery and they will call the office when patient is cleared.  I attempted to reach patient. Lvm.

## 2025-05-08 NOTE — TELEPHONE ENCOUNTER
I reached out to patient and he was of course aware he had not been cleared. He has to get bp under control and also see sleep provider according to patient before pcp will clear.  He will c/b when is cleared.

## 2025-05-27 ENCOUNTER — HOSPITAL ENCOUNTER (OUTPATIENT)
Age: 55
Discharge: HOME | End: 2025-05-27
Payer: OTHER GOVERNMENT

## 2025-05-27 ENCOUNTER — HOSPITAL ENCOUNTER (OUTPATIENT)
Dept: GENERAL RADIOLOGY | Age: 55
Discharge: HOME | End: 2025-05-27
Payer: OTHER GOVERNMENT

## 2025-05-27 DIAGNOSIS — Z01.811 PRE-OP CHEST EXAM: ICD-10-CM

## 2025-05-27 PROCEDURE — 71046 X-RAY EXAM CHEST 2 VIEWS: CPT
